# Patient Record
Sex: FEMALE | Race: WHITE | NOT HISPANIC OR LATINO | URBAN - NONMETROPOLITAN AREA
[De-identification: names, ages, dates, MRNs, and addresses within clinical notes are randomized per-mention and may not be internally consistent; named-entity substitution may affect disease eponyms.]

---

## 2019-01-28 ENCOUNTER — IMPORTED ENCOUNTER (OUTPATIENT)
Dept: URBAN - NONMETROPOLITAN AREA CLINIC 1 | Facility: CLINIC | Age: 39
End: 2019-01-28

## 2019-01-28 PROBLEM — Z01.00: Noted: 2019-01-28

## 2019-01-28 PROBLEM — H52.4: Noted: 2019-01-28

## 2019-01-28 PROCEDURE — S0620 ROUTINE OPHTHALMOLOGICAL EXA: HCPCS

## 2019-01-28 NOTE — PATIENT DISCUSSION
Routine Eye Examhealthy eyesPresbyopia-Discussed diagnosis with patient. RTC: 2yrs CEE Updated spec Rx given. Recommend lens that will provide comfort as well as protect safety and health of eyes.

## 2021-03-29 ENCOUNTER — IMPORTED ENCOUNTER (OUTPATIENT)
Dept: URBAN - NONMETROPOLITAN AREA CLINIC 1 | Facility: CLINIC | Age: 41
End: 2021-03-29

## 2021-03-29 PROBLEM — H52.4: Noted: 2021-03-29

## 2021-03-29 PROCEDURE — 92015 DETERMINE REFRACTIVE STATE: CPT

## 2021-03-29 PROCEDURE — 92014 COMPRE OPH EXAM EST PT 1/>: CPT

## 2022-04-09 ASSESSMENT — TONOMETRY
OD_IOP_MMHG: 14
OS_IOP_MMHG: 14
OD_IOP_MMHG: 10
OS_IOP_MMHG: 10

## 2022-04-09 ASSESSMENT — VISUAL ACUITY
OS_SC: J1+
OD_CC: 20/20
OD_CC: 20/20
OD_SC: J1+
OS_CC: 20/20
OS_CC: 20/30+